# Patient Record
Sex: MALE | Race: WHITE | NOT HISPANIC OR LATINO | Employment: FULL TIME | URBAN - METROPOLITAN AREA
[De-identification: names, ages, dates, MRNs, and addresses within clinical notes are randomized per-mention and may not be internally consistent; named-entity substitution may affect disease eponyms.]

---

## 2023-12-17 ENCOUNTER — HOSPITAL ENCOUNTER (OUTPATIENT)
Dept: RADIOLOGY | Facility: EXTERNAL LOCATION | Age: 33
Discharge: HOME | End: 2023-12-17

## 2023-12-17 DIAGNOSIS — M79.641 RIGHT HAND PAIN: ICD-10-CM

## 2024-03-26 ENCOUNTER — HOSPITAL ENCOUNTER (EMERGENCY)
Facility: HOSPITAL | Age: 34
Discharge: HOME | End: 2024-03-26
Payer: MEDICAID

## 2024-03-26 VITALS
OXYGEN SATURATION: 99 % | HEART RATE: 96 BPM | SYSTOLIC BLOOD PRESSURE: 128 MMHG | HEIGHT: 73 IN | DIASTOLIC BLOOD PRESSURE: 77 MMHG | RESPIRATION RATE: 20 BRPM | BODY MASS INDEX: 26 KG/M2 | WEIGHT: 196.21 LBS | TEMPERATURE: 98.4 F

## 2024-03-26 DIAGNOSIS — L02.91 ABSCESS: Primary | ICD-10-CM

## 2024-03-26 PROCEDURE — 99283 EMERGENCY DEPT VISIT LOW MDM: CPT

## 2024-03-26 RX ORDER — CLINDAMYCIN HYDROCHLORIDE 150 MG/1
450 CAPSULE ORAL 3 TIMES DAILY
Qty: 63 CAPSULE | Refills: 0 | Status: SHIPPED | OUTPATIENT
Start: 2024-03-26 | End: 2024-04-02

## 2024-03-26 ASSESSMENT — PAIN DESCRIPTION - LOCATION: LOCATION: BUTTOCKS

## 2024-03-26 ASSESSMENT — PAIN DESCRIPTION - FREQUENCY: FREQUENCY: CONSTANT/CONTINUOUS

## 2024-03-26 ASSESSMENT — COLUMBIA-SUICIDE SEVERITY RATING SCALE - C-SSRS
2. HAVE YOU ACTUALLY HAD ANY THOUGHTS OF KILLING YOURSELF?: NO
6. HAVE YOU EVER DONE ANYTHING, STARTED TO DO ANYTHING, OR PREPARED TO DO ANYTHING TO END YOUR LIFE?: NO
1. IN THE PAST MONTH, HAVE YOU WISHED YOU WERE DEAD OR WISHED YOU COULD GO TO SLEEP AND NOT WAKE UP?: NO

## 2024-03-26 ASSESSMENT — PAIN SCALES - GENERAL: PAINLEVEL_OUTOF10: 10 - WORST POSSIBLE PAIN

## 2024-03-26 ASSESSMENT — PAIN - FUNCTIONAL ASSESSMENT: PAIN_FUNCTIONAL_ASSESSMENT: 0-10

## 2024-03-26 ASSESSMENT — PAIN DESCRIPTION - PAIN TYPE: TYPE: ACUTE PAIN

## 2024-03-26 ASSESSMENT — PAIN DESCRIPTION - PROGRESSION: CLINICAL_PROGRESSION: NOT CHANGED

## 2024-03-26 ASSESSMENT — PAIN DESCRIPTION - ORIENTATION: ORIENTATION: RIGHT;LEFT

## 2024-03-27 NOTE — ED PROVIDER NOTES
HPI   Chief Complaint   Patient presents with    Cyst     Cyst on buttock and a rash, severe pain, has been going on for past few days, woke up with fever this am       HPI  See my MDM                  [place] Coma Scale Score: 15                     Patient History   History reviewed. No pertinent past medical history.  Past Surgical History:   Procedure Laterality Date    OTHER SURGICAL HISTORY  04/06/2016    Oral Surgery     No family history on file.  Social History     Tobacco Use    Smoking status: Unknown    Smokeless tobacco: Not on file   Substance Use Topics    Alcohol use: Not on file    Drug use: Not on file       Physical Exam   ED Triage Vitals [03/26/24 1844]   Temperature Heart Rate Respirations BP   36.9 °C (98.4 °F) 96 20 128/77      Pulse Ox Temp Source Heart Rate Source Patient Position   99 % Oral Monitor Sitting      BP Location FiO2 (%)     Right arm --       Physical Exam  CONSTITUTIONAL: Vital signs reviewed as charted, well-developed and in no distress  Eyes: Extraocular muscles are intact. Pupils equal round and reactive to light. Conjunctiva are pink.    ENT: Mucous membranes are moist. Tongue in the midline. Pharynx was without erythema or exudates, uvula midline  LUNGS: Breath sounds equal and clear to auscultation. Good air exchange, no wheezes rales or retractions, pulse oximetry is charted.  HEART: Regular rate and rhythm without murmur thrill or rub, strong tones, auscultation is normal.  ABDOMEN: Soft and nontender without guarding rebound rigidity or mass. Bowel sounds are present and normal in all quadrants. There is no palpable masses or aneurysms identified. No hepatosplenomegaly, normal abdominal exam.  Neuro: The patient is awake, alert and oriented ×3. Moving all 4 extremities and answering questions appropriately.   MUSCULOSKELETAL: The calves are nontender to palpation. Full gross active range of motion.   PSYCH: Awake alert oriented, normal mood and affect.  Skin:  Dry,  "normal color, warm to the touch, no rash present.  Examination of the buttock shows a small opening at the top of the butt crack and another 1 down about an inch and a half lower.  Does have some induration over the left superior gluteal fold.  There is no fluctuance.  There is no open drainage.    ED Course & MDM   Diagnoses as of 03/26/24 2106   Abscess       Medical Decision Making  History obtained from: patient    Vital signs, nursing notes, current medications, past medical history, Surgical history, allergies, social history, family History were reviewed.         HPI:  Patient 33-year-old gentleman present emergency room today states he got a painful lump left side of his buttock.  He noticed there yesterday.  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  He denies any drainage.  Denies difficulty having bowel movements.  Denies dizziness, chest pain, shortness breath, abdominal pain extremity edema.  Denies nausea vomiting diarrhea or constipation.  Nontoxic well-appearing      10 point ROS was reviewed and negative except Noted above in HPI.  DDX: as listed above          MDM Summary/considerations:  EMERGENCY DEPARTMENT COURSE and DIFFERENTIAL DIAGNOSIS/MDM:        The patient presented with a chief complaint of left gluteal lump. The differential diagnosis associated with this patient's presentation includes pilonidal cyst, abscess, cellulitis.       Vitals:    Vitals:    03/26/24 1844   BP: 128/77   BP Location: Right arm   Patient Position: Sitting   Pulse: 96   Resp: 20   Temp: 36.9 °C (98.4 °F)   TempSrc: Oral   SpO2: 99%   Weight: 89 kg (196 lb 3.4 oz)   Height: 1.854 m (6' 1\")       Diagnoses as of 03/26/24 2107   Abscess       History Limited by:    None    Independent history obtained from:    None      External records reviewed:    None      Diagnostics interpreted by me:    None      Discussions with other clinicians:    None      Chronic conditions impacting care:    None      Social " determinants of health affecting care:    None    Diagnostic tests considered but not performed: none    ED Medications managed:    Medications - No data to display      Prescription drugs considered:    Antibiotics clindamycin    Labs Reviewed - No data to display  No orders to display     Medications - No data to display  Discharge Medication List as of 3/26/2024  6:59 PM        START taking these medications    Details   clindamycin (Cleocin) 150 mg capsule Take 3 capsules (450 mg) by mouth 3 times a day for 7 days., Starting Tue 3/26/2024, Until Tue 4/2/2024, Print           I estimate there is LOW risk for  COMPARTMENT SYNDROME, NECROTIZING FASCIITIS, TENDON OR NEUROVASCULAR INJURY, or FOREIGN BODY, thus I consider the discharge disposition reasonable. Also, there is no evidence for peritonitis, sepsis, or toxicity. We have discussed the diagnosis and risks, and we agree with discharging home to follow-up with their primary doctor. We also discussed returning to the Emergency Department immediately if new or worsening symptoms occur. We have discussed the symptoms which are most concerning (e.g., changing or worsening pain, fever, numbness, weakness, cool or painful digits) that necessitate immediate return.     Examination consistent with cellulitis possible early abscess without fluctuance.  Could be pilonidal cyst as well given early appearance is difficult to discern pain.  Have started on antibiotics, recommend following with general surgery 1 to 2 days for reevaluation.  Was discharged home in stable condition.    All of the patient's questions were answered to the best of my ability.  Patient states understanding that they have been screened for an emergency today and we have not found any etiology of symptoms that requires emergent treatment or admission to the hospital at this point. They understand that they have not had definitive care day and require follow-up for treatment of their condition. They  also state understanding that they may have an emergent condition that may potentially have not of detected at this visit and they must return to the emergency department if they develop any worsening of symptoms or new complaints.          I have evaluated this patient, my supervising physician was available for consultation.            Critical Care: Not warranted at this time        This chart was completed using voice recognition transcription software. Please excuse any errors of transcription including grammatical, punctuation, syntax and spelling errors.  Please contact me with any questions regarding this chart.    Procedure  Procedures     MANOJ Anguiano-DEEP  03/26/24 7085

## 2024-03-29 ENCOUNTER — OFFICE VISIT (OUTPATIENT)
Dept: SURGERY | Facility: CLINIC | Age: 34
End: 2024-03-29
Payer: MEDICAID

## 2024-03-29 ENCOUNTER — HOSPITAL ENCOUNTER (OUTPATIENT)
Facility: HOSPITAL | Age: 34
Setting detail: OUTPATIENT SURGERY
End: 2024-03-29
Attending: SURGERY | Admitting: SURGERY

## 2024-03-29 VITALS
DIASTOLIC BLOOD PRESSURE: 58 MMHG | BODY MASS INDEX: 25.95 KG/M2 | TEMPERATURE: 98 F | SYSTOLIC BLOOD PRESSURE: 100 MMHG | HEIGHT: 73 IN | WEIGHT: 195.8 LBS | OXYGEN SATURATION: 98 % | HEART RATE: 82 BPM

## 2024-03-29 DIAGNOSIS — L05.91 PILONIDAL CYST: Primary | ICD-10-CM

## 2024-03-29 PROCEDURE — 99213 OFFICE O/P EST LOW 20 MIN: CPT | Performed by: SURGERY

## 2024-03-29 PROCEDURE — 1036F TOBACCO NON-USER: CPT | Performed by: SURGERY

## 2024-03-29 PROCEDURE — 99203 OFFICE O/P NEW LOW 30 MIN: CPT | Performed by: SURGERY

## 2024-03-29 RX ORDER — OXYCODONE HYDROCHLORIDE 5 MG/1
10 TABLET ORAL ONCE
Status: CANCELLED | OUTPATIENT
Start: 2024-03-29 | End: 2024-03-29

## 2024-03-29 RX ORDER — METRONIDAZOLE 500 MG/100ML
500 INJECTION, SOLUTION INTRAVENOUS ONCE
Status: CANCELLED | OUTPATIENT
Start: 2024-03-29 | End: 2024-03-29

## 2024-03-29 RX ORDER — CIPROFLOXACIN 2 MG/ML
400 INJECTION, SOLUTION INTRAVENOUS ONCE
Status: CANCELLED | OUTPATIENT
Start: 2024-03-29 | End: 2024-03-29

## 2024-03-29 RX ORDER — GABAPENTIN 600 MG/1
600 TABLET ORAL ONCE
Status: CANCELLED | OUTPATIENT
Start: 2024-03-29 | End: 2024-03-29

## 2024-03-29 RX ORDER — CLINDAMYCIN HYDROCHLORIDE 300 MG/1
300 CAPSULE ORAL 4 TIMES DAILY
Qty: 28 CAPSULE | Refills: 0 | Status: SHIPPED | OUTPATIENT
Start: 2024-03-29 | End: 2024-04-05

## 2024-03-29 RX ORDER — SODIUM CHLORIDE, SODIUM LACTATE, POTASSIUM CHLORIDE, CALCIUM CHLORIDE 600; 310; 30; 20 MG/100ML; MG/100ML; MG/100ML; MG/100ML
100 INJECTION, SOLUTION INTRAVENOUS CONTINUOUS
Status: CANCELLED | OUTPATIENT
Start: 2024-03-29

## 2024-03-29 RX ORDER — ACETAMINOPHEN 325 MG/1
975 TABLET ORAL ONCE
Status: CANCELLED | OUTPATIENT
Start: 2024-03-29 | End: 2024-03-29

## 2024-03-29 ASSESSMENT — PATIENT HEALTH QUESTIONNAIRE - PHQ9
1. LITTLE INTEREST OR PLEASURE IN DOING THINGS: NOT AT ALL
2. FEELING DOWN, DEPRESSED OR HOPELESS: NOT AT ALL
SUM OF ALL RESPONSES TO PHQ9 QUESTIONS 1 AND 2: 0

## 2024-03-29 ASSESSMENT — ENCOUNTER SYMPTOMS
PSYCHIATRIC NEGATIVE: 1
CARDIOVASCULAR NEGATIVE: 1
MUSCULOSKELETAL NEGATIVE: 1
HEMATOLOGIC/LYMPHATIC NEGATIVE: 1
CONSTITUTIONAL NEGATIVE: 1
RESPIRATORY NEGATIVE: 1
NEUROLOGICAL NEGATIVE: 1
ENDOCRINE NEGATIVE: 1
EYES NEGATIVE: 1
GASTROINTESTINAL NEGATIVE: 1

## 2024-03-29 ASSESSMENT — PAIN SCALES - GENERAL: PAINLEVEL: 5

## 2024-03-29 NOTE — PROGRESS NOTES
General Surgery Service    History Of Present Illness    Douglas Eschweiler is a 33 y.o. male presenting with concern for pilonidal cyst. The patient was seen in the ER, prescribed antibiotics, and referred here. 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    Noted drainage from the site over the past couple of days. Fullness has partially improved since the drainage. Clindamycin has been taken for several days.      Past Medical History  He has no past medical history on file.    Current Outpatient Medications on File Prior to Visit   Medication Sig Dispense Refill    clindamycin (Cleocin) 150 mg capsule Take 3 capsules (450 mg) by mouth 3 times a day for 7 days. 63 capsule 0     No current facility-administered medications on file prior to visit.         Surgical History  He has a past surgical history that includes Other surgical history (04/06/2016).     Social History  He reports that he has quit smoking. His smoking use included cigarettes. He has quit using smokeless tobacco.  His smokeless tobacco use included chew. He reports current alcohol use. He reports that he does not use drugs.    Family History  Family History   Problem Relation Name Age of Onset    No Known Problems Mother      No Known Problems Father          Allergies  Amoxicillin and Bactrim [sulfamethoxazole-trimethoprim]    Review of Systems   Constitutional: Negative.    HENT: Negative.     Eyes: Negative.    Respiratory: Negative.     Cardiovascular: Negative.    Gastrointestinal: Negative.    Endocrine: Negative.    Genitourinary: Negative.    Musculoskeletal: Negative.    Skin: Negative.    Neurological: Negative.    Hematological: Negative.    Psychiatric/Behavioral: Negative.          Physical Exam  Constitutional:       Appearance: Normal appearance.   HENT:      Head: Normocephalic.      Nose: Nose normal.      Mouth/Throat:      Mouth: Mucous membranes are dry.   Eyes:      Extraocular Movements: Extraocular movements  "intact.      Pupils: Pupils are equal, round, and reactive to light.   Cardiovascular:      Rate and Rhythm: Normal rate.      Pulses: Normal pulses.   Pulmonary:      Effort: Pulmonary effort is normal.   Abdominal:      General: Abdomen is flat.      Palpations: Abdomen is soft.   Musculoskeletal:         General: Normal range of motion.      Cervical back: Normal range of motion.   Skin:     General: Skin is warm and dry.   Neurological:      Mental Status: He is alert and oriented to person, place, and time.   Psychiatric:         Mood and Affect: Mood normal.         Behavior: Behavior normal.          Last Recorded Vitals  /58 (BP Location: Left arm, Patient Position: Sitting)   Pulse 82   Temp 36.7 °C (98 °F) (Oral)   Wt 88.8 kg (195 lb 12.8 oz)   SpO2 98%     Relevant Results      No results found for this or any previous visit (from the past 24 hour(s)).   No results found for: \"HGBA1C\"   No results found.    Active Problems:  There are no active Hospital Problems.     Assessment/Plan   Diagnoses and all orders for this visit:  Pilonidal cyst  -     Case Request Operating Room: Excision Cyst Pilonidal; Standing  -     Request for Pre-Admission Testing Visit; Future  -     clindamycin (Cleocin) 300 mg capsule; Take 1 capsule (300 mg) by mouth 4 times a day for 7 days.  Other orders  -     Place in outpatient/hospital ambulatory surgery; Standing  -     Full code; Standing  -     lactated Ringer's infusion  -     metroNIDAZOLE (Flagyl) 500 mg in NaCl (iso-os) 100 mL  -     ciprofloxacin (Cipro) IVPB 400 mg  -     acetaminophen (Tylenol) tablet 975 mg  -     gabapentin (Neurontin) tablet 600 mg  -     oxyCODONE (Roxicodone) immediate release tablet 10 mg      Plan for excision of the pilonidal cyst. Discussed that after excision there is still high risk for prolonged wound healing given the location of the wound. The patient voices understanding.          Gabriela Massey MD   "

## 2024-03-29 NOTE — PATIENT INSTRUCTIONS
Thank you for scheduling surgery with Dr. Massey. Below you will find your Surgery Itinerary to include dates, times, and locations for appointments involved with your procedure.    You may be scheduled for a Pre Admission Testing appointment. A representative from the hospital will contact you directly to schedule any Pre Admission Testing appointments needed.    On the day before the scheduled surgery, please call the Same Day Surgery department between 2-4 pm for a time of arrival for the day of procedure.  [facility] [phone]    Nothing to eat or drink after midnight the night before surgery    Surgery with Dr. Massey at [facility] - [address] [place] [address]   On Thursday April 25th, 2024     *Please note, you may receive a call from our financial counselors if you have a financial liability greater than $250.       [facility]  Pre - Operative Instructions     Your time of arrival for surgery is available the day before surgery. *If the surgery is on a Monday, or the Tuesday following a Monday Holiday, please call Same Day Surgery the Friday before your surgery date.*  DO NOT EAT OR DRINK ANYTHING AFTER MIDNIGHT THE NIGHT BEFORE SURGERY. This includes any beverages (coffee, water, soda, etc.), hard candy, gum or mints. If this is not followed, surgery may be canceled. Please avoid eating a large meal the evening before surgery. Please do not DRINK ALCOHOL or SMOKE FOR 24 HOURS before surgery.   Insulin Instructions - Please do not take any short acting Insulin (Regular or NPH). Do not take any oral diabetic medication on the day of surgery. Long acting Insulins may be taken (Lantus). We will check your blood sugar and administer at the hospital the day of surgery. Patient who have Insulin pumps are to make NO adjustments.   Prescription Medications - You are encouraged to take prescription medications including heart, blood pressure, anti-seizure,  anxiety, breathing medications (including inhalers) with exception to diabetic medications prior to arriving at the hospital the day of surgery. You may take prescribed pain medications as needed. Please remember the dose and time taken so we may inform your Anesthesiologist.   Please bring the name, dosage, and frequency of your medications if you did not provide these on the day of Pre Admission Testing. You may bring the actual bottles if this would be easier for you.   Please bring your prescribed inhalers with you the morning of surgery.   Patients on Anti-platelet and Anticoagulant agents, please read the following:  ASA, NSAIDS stop 5 days prior to surgery  Coumadin stop 5 days prior to surgery unless bridging therapy is needed (metal valve replacement, cardiac stent placement) - if so please speak to your Cardiologist or prescribing physician.   Other anti-platelet and anticoagulant agents:  Plavix (Clopidogrel) stop 5 days prior to surgery  Brilinta (Ticagrelor) stop 5 days prior to surgery   Effient (Prasugrel) stop 7 days prior to surgery   Lovenox (Enoxaparin) stop 24 hours prior to surgery  Arixtra (Fondaparinux) stop 5 days prior to surgery  Xarelto (Rivaroxaban) stop 3 days prior to surgery  Pradaxa (Dabigatran) stop 5 days prior to surgery  Eliquis (Apixaban) stop 3 days prior to surgery   Savaysa (Endoxaban) stop days prior to surgery     Please stop all herbal medications 2 weeks prior to surgery   C-PAP Devices - If you have a C-PAP device at home, bring it with you on our day of surgery if your surgery requires you to stay overnight.   Please bring a copy of any Advanced Directives the day of surgery if you did not provide it at Pre Admission Testing. These documents are living bauman and durable power of  for healthcare.   Please notify your physician/surgeon if you develop a cold, sore throat, fever, flu symptoms, COVID symptoms or any changes in your physical conditions.   A shower or  bath is preferred the evening before or the morning of surgery.   Remove jewelry before admission to the hospital. It is no longer permitted to tape rings. We ask that you leave all valuables at home. Any items of value will be given to a family member or locked up by security.   If you have a body piercing g that you cannot remove, it is recommended that you have it removed professionally and have a plastic spacer inserted. There is a risk for surgical burns with jewelry left in place.   Remove or wear minimal makeup the day of surgery. You will be asked to remove glasses and contact lenses prior to surgery. Please bring a glass case and/or contact lens case with you. These items are not provided.   Dentures and partials are usually removed prior to surgery. A denture cup will be provided for you.   You may be asked to remove nail polish. Acrylic nails are now acceptable.   Wear loose comfortable clothing that you will be able to fit over bandages when you leave the hospitals (as appropriate for your surgery).  Patients that are under the age of 18 years must have a parent or guardian present the day of surgery.   Family members of significant others may stay with you on the Same Day Surgery unit. While you are in surgery, they may wait for you in the family waiting area. The physician will speak to the waiting family, if permitted by the patient, after surgery.   Changes or delays in the surgery schedule may occur due to emergencies. The hospital will notify you if this occurs. We apologize for any inconveniences this may present.   You must have a responsible  available to drive you home after surgery. You will not be permitted to drive yourself home after surgery if you have received any anesthesia or sedation during your procedure.   Please visit our website at [email or web address] for more information regarding [facility].    For questions about your Pre Admission Testing (PAT)  [place]  [facility] [phone]  [facility] [phone]    Thank you for choosing [facility]!

## 2024-04-02 ENCOUNTER — TELEPHONE (OUTPATIENT)
Dept: SURGERY | Facility: CLINIC | Age: 34
End: 2024-04-02

## 2024-04-02 NOTE — TELEPHONE ENCOUNTER
Patient scheduled for surgery 04/25/24 with Dr. Massey at [facility]. Prior auth cannot be completed for surgery due to patient not having active coverage with [employer] and no other coverage listed in chart. Patient's coverage has been inactive as of 04/01/2024 (was active through 03/31/2024). Tried to contact patient at number listed, left message to call office back with updated insurance information or to potentially reschedule surgery. If patient does not have active coverage, patient will be deemed self pay.

## 2024-04-08 NOTE — TELEPHONE ENCOUNTER
Tried to contact patient at number listed, left message to call office back with updated insurance information or to potentially reschedule surgery.  Financial counselor called office to inform they could not reach patient either.

## 2024-04-15 ENCOUNTER — TELEPHONE (OUTPATIENT)
Dept: SURGERY | Facility: CLINIC | Age: 34
End: 2024-04-15

## 2024-04-15 NOTE — TELEPHONE ENCOUNTER
Patient returned call to office regarding insurance. Patient stated he will call back to schedule surgery once his insurance situation is sorted out. Surgery and corresponding post op visit cancelled. Patient verbalized understanding and denied having any other questions at this time.

## 2024-04-15 NOTE — TELEPHONE ENCOUNTER
Patient was seen for pilonidal cyst with Dr. Massey on 03/29/2024. Patient has been taking ATB and reports the pilonidal cyst began draining yesterday. Patient calling to report today is his last day for ATB and per his conversation with Dr. Massey might need this prescription extended a few days to ensure it does not become infected prior to surgery on 04/25/2024. Patient pharmacy in chart is correct. Please review and advise if patient needs to have ATB therapy extended. Thank you.

## 2024-04-15 NOTE — TELEPHONE ENCOUNTER
Patient was seen for pilonidal cyst with Dr. Massey on 03/29/2024. Patient has been taking ATB and reports the pilonidal cyst began draining yesterday. Patient calling to report today is his last day for ATB and per his conversation with Dr. Massey might need this prescription extended a few days to ensure it does not become infected prior to surgery on 04/25/2024. Patient pharmacy in chart is correct. This nurse to notify Dr. Massey via this encounter. See separate telephone encounter regarding the above information.

## 2024-04-15 NOTE — TELEPHONE ENCOUNTER
Tried to contact patient to obtain updated insurance information or to reschedule surgery. No answer at number listed. Patient has until 04/18/24 to return call to office, surgery will be cancelled if patient does not do so. Left message for patient with above information.

## 2024-05-08 ENCOUNTER — APPOINTMENT (OUTPATIENT)
Dept: SURGERY | Facility: CLINIC | Age: 34
End: 2024-05-08